# Patient Record
Sex: FEMALE | Race: WHITE | NOT HISPANIC OR LATINO | Employment: UNEMPLOYED | URBAN - METROPOLITAN AREA
[De-identification: names, ages, dates, MRNs, and addresses within clinical notes are randomized per-mention and may not be internally consistent; named-entity substitution may affect disease eponyms.]

---

## 2017-06-08 ENCOUNTER — COMMUNICATION - HEALTHEAST (OUTPATIENT)
Dept: HEALTH INFORMATION MANAGEMENT | Facility: CLINIC | Age: 32
End: 2017-06-08

## 2017-06-08 ENCOUNTER — COMMUNICATION - HEALTHEAST (OUTPATIENT)
Dept: TELEHEALTH | Facility: CLINIC | Age: 32
End: 2017-06-08

## 2017-06-23 ENCOUNTER — COMMUNICATION - HEALTHEAST (OUTPATIENT)
Dept: HEALTH INFORMATION MANAGEMENT | Facility: CLINIC | Age: 32
End: 2017-06-23

## 2019-07-05 ENCOUNTER — OFFICE VISIT - HEALTHEAST (OUTPATIENT)
Dept: FAMILY MEDICINE | Facility: CLINIC | Age: 34
End: 2019-07-05
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
## 2019-07-05 ASSESSMENT — MIFFLIN-ST. JEOR: SCORE: 1501.69

## 2021-02-09 ENCOUNTER — RECORDS - HEALTHEAST (OUTPATIENT)
Dept: ADMINISTRATIVE | Facility: OTHER | Age: 36
End: 2021-02-09

## 2021-02-09 ENCOUNTER — HOSPITAL ENCOUNTER (OUTPATIENT)
Dept: ULTRASOUND IMAGING | Facility: CLINIC | Age: 36
Discharge: HOME | End: 2021-02-09

## 2021-02-09 DIAGNOSIS — Z31.9 PROCREATIVE MANAGEMENT: ICD-10-CM

## 2021-02-12 ENCOUNTER — HOSPITAL ENCOUNTER (OUTPATIENT)
Dept: RADIOLOGY | Facility: CLINIC | Age: 36
Discharge: HOME | End: 2021-02-12
Admitting: RADIOLOGY

## 2021-02-12 DIAGNOSIS — Z31.41 FERTILITY TESTING: ICD-10-CM
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
## 2021-06-03 VITALS — WEIGHT: 185.75 LBS | BODY MASS INDEX: 32.91 KG/M2 | HEIGHT: 63 IN

## 2021-06-17 NOTE — PATIENT INSTRUCTIONS - HE
Patient Instructions by Jennifer Granger MD at 7/5/2019  4:00 PM     Author: Jennifer Granger MD Service: -- Author Type: Physician    Filed: 7/5/2019  4:29 PM Encounter Date: 7/5/2019 Status: Addendum    : Jennifer Granger MD (Physician)    Related Notes: Original Note by Jennifer Granger MD (Physician) filed at 7/5/2019  4:27 PM       Patient Education   1. Left arm area: warm compress (towel) three times for 10 minutes, I would expect it to get better in 10 days.   2. If you still note a lump or pain in 6 weeks then return.   Coping with Concussion  Concussion is also known as mild traumatic brain injury (MTBI). It is often caused by a blow to the head, or a fall. You may have been unconscious for a few seconds or minutes after the injury. Or maybe you were dazed, confused, or saw stars. After this, you thought you were OK. Now, weeks or months later, youre having symptoms that may be caused by a concussion. The good news is that, in most people,  these symptoms will likely go away on their own. Most people with a concussion recover fully, with no need for treatment.     A cold compress can help relieve a headache.    What is a concussion?  A concussion is a mild form of brain injury. In some cases, the effects of a concussion go away within days of the injury. In others, symptoms may continue for a few months. Fortunately, a concussion is temporary. Even when symptoms stay for months, they do go away over time. If they don't, or if your symptoms are worse, contact your healthcare provider.  Symptoms of a concussion  You may have noticed some of these symptoms:    Headaches    Irritability and other changes in behavior    Problems remembering or concentrating    Dizziness or lack of coordination    Fatigue    Problems sleeping    Sensitivity to light and sound    Vision changes  NOTE: If you have severe symptoms or trouble functioning, talk with your healthcare provider right away. If you had a  more serious head injury than a concussion, you likely need treatment. Be sure to see your healthcare provider for an evaluation.   What you can do  Since the effects of a concussion go away over time, there isnt a lot you need to do. Be assured that this problem is temporary. Youll likely have a full recovery. In the meantime, talk with your healthcare provider about ways to relieve any symptoms that are bothering you. These tips may help:    Don't return to sports or any activity that could cause you to hit your head until all symptoms are gone and you have been cleared by your doctor. A second head injury before fully recovering from the first one can lead to serious brain injury.    Return to normal activities of daily living and normal social interaction is encouraged to speed recovery.    Stress can make symptoms worse. Help calm yourself by resting in a quiet place and imagining a peaceful scene. Relax your muscles by soaking in a hot bath or taking a hot shower.    Take over-the-counter  acetaminophen to relieve headache pain. Take them as directed on the package. Don't take ibuprofen or aspirin after a head injury.    If you become dizzy, sit or lie down in a safe place until the sensation passes. Dont drive when you feel dizzy or disoriented.    If youre having trouble sleeping, try to keep a regular sleep schedule. Go to bed and get up at the same time each day. Avoid or limit caffeine and nicotine. Also don't drink alcohol. It may help you sleep at first, but your sleep will not be restful.    Give yourself time to heal. Your recovery will take some time. When you have symptoms, remember that you wont feel this way forever. In time the symptoms will go away and youll be back to yourself.  If youre not feeling better  The effects of a concussion often go away in 7 to 10 days and the vast majority of people who have had a concussion have recovered after 3 months. If youre not feeling better as time passes,  "there may be something else going on. If your symptoms dont go away or you notice new ones, talk with your healthcare provider. He or she can help you get the treatment you need.   Date Last Reviewed: 1/1/2018 2000-2017 [employer]. [address] [place] [id number]. All rights reserved. This information is not intended as a substitute for professional medical care. Always follow your healthcare professional's instructions.    Patient Education   Patient Education     Concussion    A concussion can be caused by a direct blow to the head, neck, face, or somewhere else on the body with the force being transmitted to the head. This may cause you to lose consciousness - be \"knocked out\" - but not always. Depending on the severity of the blow, it will take from a few hours up to a few days to get better. Sometimes symptoms may last a few months or longer. This is called post-concussion syndrome.  At first, you may have a headache, nausea, vomiting, or dizziness. You may also have problems concentrating or remembering things. This is normal.  Symptoms should get better as the hours and days go by. Symptoms that get worse could be a sign of a more serious injury. This might be a bruise or bleeding in the brain. Thats why its important to watch for the warning signs listed below.  Home care  If your injury is mild and there are no serious signs or symptoms, your healthcare provider may recommend that you be monitored at home. If there is evidence that the injury is more serious, you will be monitored in the hospital. Follow these tips to help care for yourself at home:    After a concussion, your healthcare provider may recommend that a family member or friend monitor you for 12 to 24 hours. They may be told to wake you every few hours during sleep to check for the signs below.    If your face or scalp swells, apply an ice pack for 20 minutes every 1 to 2 hours. Do this until the swelling starts to " go down. You can make an ice pack by putting ice cubes in a plastic bag and wrapping the bag in a towel.    You may use acetaminophen to control pain, unless another pain medicine was prescribed. Do not use aspirin or ibuprofen after a head injury. If you have chronic liver or kidney disease, talk with your doctor before using these medicines. Also talk with your doctor if you ever had a stomach ulcer or gastrointestinal bleeding.    For the next 24 hours:  ? Dont drink alcohol or take sedatives or medicines that make you sleepy.  ? Dont drive or operate machinery.  ? Avoid doing anything strenuous. Dont lift or strain.    Dont return to sports or any activity that could cause you to hit your head until all symptoms are gone and you have been cleared by your doctor. A second head injury before fully recovering from the first one can lead to serious brain injury.    Avoid doing activities that require a lot of concentration or a lot of attention. This will allow your brain to rest and heal quicker.

## 2021-06-26 ENCOUNTER — HEALTH MAINTENANCE LETTER (OUTPATIENT)
Age: 36
End: 2021-06-26

## 2021-10-12 DIAGNOSIS — Z11.59 ENCOUNTER FOR SCREENING FOR OTHER VIRAL DISEASES: ICD-10-CM

## 2021-10-16 ENCOUNTER — HEALTH MAINTENANCE LETTER (OUTPATIENT)
Age: 36
End: 2021-10-16

## 2021-10-18 ENCOUNTER — LAB (OUTPATIENT)
Dept: LAB | Facility: CLINIC | Age: 36
End: 2021-10-18
Payer: COMMERCIAL

## 2021-10-18 DIAGNOSIS — Z11.59 ENCOUNTER FOR SCREENING FOR OTHER VIRAL DISEASES: ICD-10-CM

## 2021-10-18 LAB — SARS-COV-2 RNA RESP QL NAA+PROBE: NEGATIVE

## 2021-10-18 PROCEDURE — U0005 INFEC AGEN DETEC AMPLI PROBE: HCPCS | Mod: 90 | Performed by: PATHOLOGY

## 2021-10-18 PROCEDURE — U0003 INFECTIOUS AGENT DETECTION BY NUCLEIC ACID (DNA OR RNA); SEVERE ACUTE RESPIRATORY SYNDROME CORONAVIRUS 2 (SARS-COV-2) (CORONAVIRUS DISEASE [COVID-19]), AMPLIFIED PROBE TECHNIQUE, MAKING USE OF HIGH THROUGHPUT TECHNOLOGIES AS DESCRIBED BY CMS-2020-01-R: HCPCS | Mod: 90 | Performed by: PATHOLOGY

## 2021-10-21 ENCOUNTER — LAB REQUISITION (OUTPATIENT)
Dept: LAB | Facility: CLINIC | Age: 36
End: 2021-10-21
Payer: COMMERCIAL

## 2021-10-21 ENCOUNTER — LAB (OUTPATIENT)
Dept: LAB | Facility: CLINIC | Age: 36
End: 2021-10-21
Attending: NURSE PRACTITIONER
Payer: COMMERCIAL

## 2021-10-21 DIAGNOSIS — E28.9 OVARIAN DYSFUNCTION: ICD-10-CM

## 2021-10-21 DIAGNOSIS — N84.0 POLYP OF CORPUS UTERI: ICD-10-CM

## 2021-10-21 DIAGNOSIS — E28.2 POLYCYSTIC OVARIAN SYNDROME: ICD-10-CM

## 2021-10-21 DIAGNOSIS — E28.2 POLYCYSTIC OVARIAN SYNDROME: Primary | ICD-10-CM

## 2021-10-21 LAB
ESTRADIOL SERPL-MCNC: 66 PG/ML
LH SERPL-ACNC: 13.2 IU/L
PROGEST SERPL-MCNC: 1.3 NG/ML

## 2021-10-21 PROCEDURE — 84144 ASSAY OF PROGESTERONE: CPT

## 2021-10-21 PROCEDURE — 82670 ASSAY OF TOTAL ESTRADIOL: CPT

## 2021-10-21 PROCEDURE — 88305 TISSUE EXAM BY PATHOLOGIST: CPT | Mod: TC,ORL | Performed by: OBSTETRICS & GYNECOLOGY

## 2021-10-21 PROCEDURE — 36415 COLL VENOUS BLD VENIPUNCTURE: CPT

## 2021-10-21 PROCEDURE — 83002 ASSAY OF GONADOTROPIN (LH): CPT

## 2021-10-22 LAB
PATH REPORT.COMMENTS IMP SPEC: NORMAL
PATH REPORT.COMMENTS IMP SPEC: NORMAL
PATH REPORT.FINAL DX SPEC: NORMAL
PATH REPORT.GROSS SPEC: NORMAL
PATH REPORT.MICROSCOPIC SPEC OTHER STN: NORMAL
PATH REPORT.RELEVANT HX SPEC: NORMAL
PHOTO IMAGE: NORMAL

## 2021-10-22 PROCEDURE — 88305 TISSUE EXAM BY PATHOLOGIST: CPT | Mod: 26 | Performed by: PATHOLOGY

## 2021-10-23 ENCOUNTER — LAB (OUTPATIENT)
Dept: LAB | Facility: HOSPITAL | Age: 36
End: 2021-10-23
Payer: COMMERCIAL

## 2021-10-23 DIAGNOSIS — E28.9 DISORDER OF ENDOCRINE OVARY: ICD-10-CM

## 2021-10-23 DIAGNOSIS — E28.2 POLYCYSTIC OVARIES: Primary | ICD-10-CM

## 2021-10-23 LAB
ESTRADIOL SERPL-MCNC: 93 PG/ML
LH SERPL-ACNC: 8.1 MIU/ML
PROGEST SERPL-MCNC: 0.5 NG/ML

## 2021-10-23 PROCEDURE — 83002 ASSAY OF GONADOTROPIN (LH): CPT

## 2021-10-23 PROCEDURE — 82670 ASSAY OF TOTAL ESTRADIOL: CPT

## 2021-10-23 PROCEDURE — 36415 COLL VENOUS BLD VENIPUNCTURE: CPT

## 2021-10-23 PROCEDURE — 84144 ASSAY OF PROGESTERONE: CPT

## 2022-07-23 ENCOUNTER — HEALTH MAINTENANCE LETTER (OUTPATIENT)
Age: 37
End: 2022-07-23

## 2022-10-01 ENCOUNTER — HEALTH MAINTENANCE LETTER (OUTPATIENT)
Age: 37
End: 2022-10-01

## 2023-02-06 ENCOUNTER — HOSPITAL ENCOUNTER (OUTPATIENT)
Dept: ULTRASOUND IMAGING | Facility: HOSPITAL | Age: 38
Discharge: HOME | End: 2023-02-06
Attending: OBSTETRICS & GYNECOLOGY | Admitting: OBSTETRICS & GYNECOLOGY
Payer: COMMERCIAL

## 2023-02-06 DIAGNOSIS — E28.9 OVARIAN DYSFUNCTION: ICD-10-CM

## 2023-02-06 DIAGNOSIS — E28.2 POLYCYSTIC OVARIAN SYNDROME: ICD-10-CM

## 2023-02-06 PROCEDURE — 76830 TRANSVAGINAL US NON-OB: CPT

## 2023-02-08 ENCOUNTER — LAB (OUTPATIENT)
Dept: LAB | Facility: HOSPITAL | Age: 38
End: 2023-02-08
Attending: OBSTETRICS & GYNECOLOGY
Payer: COMMERCIAL

## 2023-02-08 ENCOUNTER — HOSPITAL ENCOUNTER (OUTPATIENT)
Dept: ULTRASOUND IMAGING | Facility: HOSPITAL | Age: 38
Discharge: HOME | End: 2023-02-08
Attending: OBSTETRICS & GYNECOLOGY
Payer: COMMERCIAL

## 2023-02-08 DIAGNOSIS — E28.2 POLYCYSTIC OVARIES: ICD-10-CM

## 2023-02-08 DIAGNOSIS — E28.9 OVARIAN DYSFUNCTION: ICD-10-CM

## 2023-02-08 DIAGNOSIS — E28.9 OVARIAN DYSFUNCTION: Primary | ICD-10-CM

## 2023-02-08 DIAGNOSIS — E28.2 POLYCYSTIC OVARY SYNDROME: ICD-10-CM

## 2023-02-08 LAB
ESTRADIOL SERPL-MCNC: 160 PG/ML
LH SERPL-ACNC: 9.9 MIU/ML
PROGEST SERPL-MCNC: 0.3 NG/ML

## 2023-02-08 PROCEDURE — 76830 TRANSVAGINAL US NON-OB: CPT

## 2023-02-08 PROCEDURE — 36415 COLL VENOUS BLD VENIPUNCTURE: CPT

## 2023-02-08 PROCEDURE — 82670 ASSAY OF TOTAL ESTRADIOL: CPT

## 2023-02-08 PROCEDURE — 83002 ASSAY OF GONADOTROPIN (LH): CPT

## 2023-02-08 PROCEDURE — 84144 ASSAY OF PROGESTERONE: CPT

## 2023-02-09 ENCOUNTER — LAB (OUTPATIENT)
Dept: LAB | Facility: HOSPITAL | Age: 38
End: 2023-02-09
Attending: OBSTETRICS & GYNECOLOGY
Payer: COMMERCIAL

## 2023-02-09 ENCOUNTER — HOSPITAL ENCOUNTER (OUTPATIENT)
Dept: ULTRASOUND IMAGING | Facility: HOSPITAL | Age: 38
Discharge: HOME | End: 2023-02-09
Attending: OBSTETRICS & GYNECOLOGY
Payer: COMMERCIAL

## 2023-02-09 DIAGNOSIS — E28.2 POLYCYSTIC OVARIAN SYNDROME: ICD-10-CM

## 2023-02-09 DIAGNOSIS — E28.9 DISORDER OF ENDOCRINE OVARY: Primary | ICD-10-CM

## 2023-02-09 DIAGNOSIS — E28.2 POLYCYSTIC OVARIES: ICD-10-CM

## 2023-02-09 DIAGNOSIS — E28.9 OVARIAN DYSFUNCTION: ICD-10-CM

## 2023-02-09 LAB
ESTRADIOL SERPL-MCNC: 338 PG/ML
LH SERPL-ACNC: 18 MIU/ML
PROGEST SERPL-MCNC: 0.6 NG/ML

## 2023-02-09 PROCEDURE — 82670 ASSAY OF TOTAL ESTRADIOL: CPT

## 2023-02-09 PROCEDURE — 83002 ASSAY OF GONADOTROPIN (LH): CPT

## 2023-02-09 PROCEDURE — 84144 ASSAY OF PROGESTERONE: CPT

## 2023-02-09 PROCEDURE — 76830 TRANSVAGINAL US NON-OB: CPT

## 2023-02-09 PROCEDURE — 36415 COLL VENOUS BLD VENIPUNCTURE: CPT

## 2023-08-06 ENCOUNTER — HEALTH MAINTENANCE LETTER (OUTPATIENT)
Age: 38
End: 2023-08-06

## 2024-03-21 ENCOUNTER — TRANSCRIBE ORDERS (OUTPATIENT)
Dept: OTHER | Age: 39
End: 2024-03-21

## 2024-03-21 DIAGNOSIS — R53.81 MALAISE: ICD-10-CM

## 2024-03-21 DIAGNOSIS — R10.11 ABDOMINAL PAIN, RIGHT UPPER QUADRANT: Primary | ICD-10-CM

## 2024-04-13 ENCOUNTER — LAB (OUTPATIENT)
Dept: LAB | Facility: HOSPITAL | Age: 39
End: 2024-04-13
Payer: COMMERCIAL

## 2024-04-13 DIAGNOSIS — E28.2 POLYCYSTIC OVARIES: Primary | ICD-10-CM

## 2024-04-13 DIAGNOSIS — E28.9 OVARIAN DYSFUNCTION: ICD-10-CM

## 2024-04-13 LAB
ESTRADIOL SERPL-MCNC: 378 PG/ML
HCG INTACT+B SERPL-ACNC: 49 MIU/ML
PROGEST SERPL-MCNC: 41.1 NG/ML

## 2024-04-13 PROCEDURE — 36415 COLL VENOUS BLD VENIPUNCTURE: CPT

## 2024-04-13 PROCEDURE — 84144 ASSAY OF PROGESTERONE: CPT

## 2024-04-13 PROCEDURE — 84702 CHORIONIC GONADOTROPIN TEST: CPT

## 2024-04-13 PROCEDURE — 82670 ASSAY OF TOTAL ESTRADIOL: CPT

## 2024-04-25 ENCOUNTER — LAB (OUTPATIENT)
Dept: LAB | Facility: HOSPITAL | Age: 39
End: 2024-04-25
Payer: COMMERCIAL

## 2024-04-25 DIAGNOSIS — E28.9 DISORDER OF ENDOCRINE OVARY: ICD-10-CM

## 2024-04-25 DIAGNOSIS — E28.2 POLYCYSTIC OVARIES: Primary | ICD-10-CM

## 2024-04-25 LAB — HCG INTACT+B SERPL-ACNC: 5131 MIU/ML

## 2024-04-25 PROCEDURE — 82670 ASSAY OF TOTAL ESTRADIOL: CPT

## 2024-04-25 PROCEDURE — 84702 CHORIONIC GONADOTROPIN TEST: CPT

## 2024-04-25 PROCEDURE — 36415 COLL VENOUS BLD VENIPUNCTURE: CPT

## 2024-04-25 PROCEDURE — 84144 ASSAY OF PROGESTERONE: CPT

## 2024-04-26 LAB
ESTRADIOL SERPL-MCNC: 666 PG/ML
PROGEST SERPL-MCNC: 20.8 NG/ML

## 2024-05-09 ENCOUNTER — LAB (OUTPATIENT)
Dept: LAB | Facility: HOSPITAL | Age: 39
End: 2024-05-09
Payer: COMMERCIAL

## 2024-05-09 DIAGNOSIS — E28.9 DISORDER OF ENDOCRINE OVARY: ICD-10-CM

## 2024-05-09 DIAGNOSIS — E28.2 POLYCYSTIC OVARIES: Primary | ICD-10-CM

## 2024-05-09 LAB
ESTRADIOL SERPL-MCNC: 741 PG/ML
HCG INTACT+B SERPL-ACNC: ABNORMAL MIU/ML
PROGEST SERPL-MCNC: 20.1 NG/ML
TSH SERPL DL<=0.005 MIU/L-ACNC: 1.39 UIU/ML (ref 0.3–4.2)

## 2024-05-09 PROCEDURE — 82670 ASSAY OF TOTAL ESTRADIOL: CPT

## 2024-05-09 PROCEDURE — 84443 ASSAY THYROID STIM HORMONE: CPT

## 2024-05-09 PROCEDURE — 84702 CHORIONIC GONADOTROPIN TEST: CPT

## 2024-05-09 PROCEDURE — 84144 ASSAY OF PROGESTERONE: CPT

## 2024-05-09 PROCEDURE — 36415 COLL VENOUS BLD VENIPUNCTURE: CPT

## 2024-05-15 ENCOUNTER — LAB (OUTPATIENT)
Dept: LAB | Facility: HOSPITAL | Age: 39
End: 2024-05-15
Payer: COMMERCIAL

## 2024-05-15 DIAGNOSIS — E28.9 OVARIAN DYSFUNCTION: ICD-10-CM

## 2024-05-15 DIAGNOSIS — E28.2 POLYCYSTIC OVARIAN SYNDROME: Primary | ICD-10-CM

## 2024-05-15 LAB
ESTRADIOL SERPL-MCNC: 832 PG/ML
HCG INTACT+B SERPL-ACNC: ABNORMAL MIU/ML
PROGEST SERPL-MCNC: 32.5 NG/ML
TSH SERPL DL<=0.005 MIU/L-ACNC: 1.39 UIU/ML (ref 0.3–4.2)

## 2024-05-15 PROCEDURE — 84702 CHORIONIC GONADOTROPIN TEST: CPT

## 2024-05-15 PROCEDURE — 84443 ASSAY THYROID STIM HORMONE: CPT

## 2024-05-15 PROCEDURE — 84144 ASSAY OF PROGESTERONE: CPT

## 2024-05-15 PROCEDURE — 82670 ASSAY OF TOTAL ESTRADIOL: CPT

## 2024-05-15 PROCEDURE — 36415 COLL VENOUS BLD VENIPUNCTURE: CPT

## 2024-05-23 ENCOUNTER — LAB (OUTPATIENT)
Dept: LAB | Facility: HOSPITAL | Age: 39
End: 2024-05-23
Payer: COMMERCIAL

## 2024-05-23 DIAGNOSIS — E28.9 OVARIAN DYSFUNCTION: ICD-10-CM

## 2024-05-23 DIAGNOSIS — E28.2 POLYCYSTIC OVARIAN SYNDROME: Primary | ICD-10-CM

## 2024-05-23 LAB
ESTRADIOL SERPL-MCNC: 890 PG/ML
HCG INTACT+B SERPL-ACNC: ABNORMAL MIU/ML
PROGEST SERPL-MCNC: 24.8 NG/ML

## 2024-05-23 PROCEDURE — 84144 ASSAY OF PROGESTERONE: CPT

## 2024-05-23 PROCEDURE — 82670 ASSAY OF TOTAL ESTRADIOL: CPT

## 2024-05-23 PROCEDURE — 84702 CHORIONIC GONADOTROPIN TEST: CPT

## 2024-05-23 PROCEDURE — 36415 COLL VENOUS BLD VENIPUNCTURE: CPT

## 2024-07-20 ENCOUNTER — HEALTH MAINTENANCE LETTER (OUTPATIENT)
Age: 39
End: 2024-07-20

## 2024-10-12 ENCOUNTER — LAB (OUTPATIENT)
Dept: LAB | Facility: HOSPITAL | Age: 39
End: 2024-10-12
Payer: COMMERCIAL

## 2024-10-12 DIAGNOSIS — R73.09 ABNORMAL GLUCOSE: Primary | ICD-10-CM

## 2024-10-12 LAB
GESTATIONAL GTT 1 HR POST DOSE: 189 MG/DL (ref 60–179)
GESTATIONAL GTT 2 HR POST DOSE: 197 MG/DL (ref 60–154)
GLUCOSE P FAST SERPL-MCNC: 83 MG/DL (ref 60–94)

## 2024-10-12 PROCEDURE — 82947 ASSAY GLUCOSE BLOOD QUANT: CPT

## 2024-10-12 PROCEDURE — 36415 COLL VENOUS BLD VENIPUNCTURE: CPT

## 2024-10-12 PROCEDURE — 82950 GLUCOSE TEST: CPT

## 2024-10-12 PROCEDURE — 82951 GLUCOSE TOLERANCE TEST (GTT): CPT

## 2024-10-13 LAB — GESTATIONAL GTT 3 HR POST DOSE: 108 MG/DL (ref 60–139)

## 2025-05-18 ENCOUNTER — HEALTH MAINTENANCE LETTER (OUTPATIENT)
Age: 40
End: 2025-05-18